# Patient Record
Sex: FEMALE | Race: WHITE | HISPANIC OR LATINO | Employment: FULL TIME | URBAN - METROPOLITAN AREA
[De-identification: names, ages, dates, MRNs, and addresses within clinical notes are randomized per-mention and may not be internally consistent; named-entity substitution may affect disease eponyms.]

---

## 2022-04-14 ENCOUNTER — TELEPHONE (OUTPATIENT)
Dept: INTERNAL MEDICINE | Facility: CLINIC | Age: 22
End: 2022-04-14

## 2022-04-14 NOTE — TELEPHONE ENCOUNTER
"Please call pt and tell her I am seeing pt in the office. I did respond to her on mychart    \"Usually insurance requires therapy x 6 wks before MRI will be approved. I have referred you to the orthopedic (bone doctor) to discuss further workup.   Elaina Sahni PA-C\"     "

## 2022-04-14 NOTE — TELEPHONE ENCOUNTER
Caller: Micah Gabriel    Relationship: Self    Best call back number: [phone]     What is the best time to reach you: 8 AM TO 10 AM     Who are you requesting to speak with NIXON DENG     What was the call regarding: PATIENT IS REQUESTING TO SPEAK ONLY WITH HER PROVIDER NIXON DENG OVER THE MATTER    Do you require a callback: YES

## 2022-11-21 ENCOUNTER — TELEPHONE (OUTPATIENT)
Dept: INTERNAL MEDICINE | Facility: CLINIC | Age: 22
End: 2022-11-21

## 2022-11-21 NOTE — TELEPHONE ENCOUNTER
Caller: Micah Gabriel    Relationship: Self    Best call back number: [phone]    Caller requesting test results: MICAH     What test was performed: X-RAY     When was the test performed: 11-15-22    Where was the test performed: [facility]     Additional notes:     PATIENT IS REQUESTING  A  CALL BACK REGARDING X-RAY RESULTS

## 2022-11-23 NOTE — TELEPHONE ENCOUNTER
Would rec the IBU as needed and we will start PT. It shows mild degenerative disease which is just wear and tear over time.

## 2022-11-23 NOTE — TELEPHONE ENCOUNTER
Hub staff attempted to follow warm transfer process and was unsuccessful     Caller: Micah Gabriel    Relationship to patient: Self    Best call back number: [phone]    Patient is needing: XRAY RESULTS FROM 11/15 - [facility] AND WOULD LIKE TO KNOW NEXT STEPS.    PLEASE ADVISE

## 2022-11-28 NOTE — TELEPHONE ENCOUNTER
Caller: Micah Gabriel    Relationship: Self    Best call back number: [phone]    What is the best time to reach you: ANY     Who are you requesting to speak with CLINICAL     What was the call regarding: PATIENT WOULD LIKE A CALL BACK REGARDING TEST RESULTS AND NEXT STEPS TO COMPLETE.       PLEASE ADVISE     CAN LEAVE VOICEMAIL IF PATENT IS NOT ABLE TO        Do you require a callback: YES

## 2022-12-12 NOTE — TELEPHONE ENCOUNTER
Spoke with patient and states she has been going to physical therapy she states she is doing better.

## 2023-04-07 ENCOUNTER — TELEPHONE (OUTPATIENT)
Dept: INTERNAL MEDICINE | Facility: CLINIC | Age: 23
End: 2023-04-07

## 2023-04-07 NOTE — TELEPHONE ENCOUNTER
Caller: Micah Gabriel    Relationship: Self    Best call back number: [phone]    What test was performed: LAB WORK     When was the test performed: 4.6.2023    Additional notes: PATIENT WOULD LIKE A CALL BACK TO DISCUSS HER LAB RESULTS AND THE MEDICATION PRESCRIBED.

## 2023-04-07 NOTE — TELEPHONE ENCOUNTER
Caller: Micah Gabriel    Relationship: Self    Best call back number: [phone]    What was the call regarding: PATIENT IS REQUESTING CALL IF THE MEDICATION CAN OR CAN'T BE FILLED. HER INSURANCE WILL  ON Monday, 04/10/2023 SO SHE WOULD LIKE SOMETHING SENT THIS WEEKEND.     Do you require a callback: YES

## 2024-05-31 ENCOUNTER — TELEPHONE (OUTPATIENT)
Dept: INTERNAL MEDICINE | Facility: CLINIC | Age: 24
End: 2024-05-31
Payer: COMMERCIAL

## 2024-05-31 NOTE — TELEPHONE ENCOUNTER
Caller: Micah Lyles    Relationship: Self    Best call back number: [phone]     What medication are you requesting:  BIRTH CONTROL PATCH    If a prescription is needed, what is your preferred pharmacy and phone number: [facility] [place] [id number] [facility] [address] [facility] - [phone]  - [phone] FX     Additional notes: PATIENT REACHING OUT TO OFFICE BECAUSE SHE DOES NOT CURRENTLY HAVE INSURANCE. PATIENT WANTING TO KNOW IF SHE NEEDS AN APPOINTMENT TO HAVE MEDICATION PRESCRIBED. IF OFFICE VISIT IS REQUIRED, PLEASE REACH OUT TO PATIENT TO SCHEDULE AND ADVISE HOW MUCH APPOINTMENT WOULD BE WITHOUT INSURANCE.     CAN CONTACT PATIENT VIA Medialive

## 2024-05-31 NOTE — TELEPHONE ENCOUNTER
Called and spoke with pt, explained to pt she would need an apt for the medication to be refilled, offered to schedule pt an apt at this time, pt declined and stated she is unsure when she can come into the office and will call us when she is ready to schedule.

## 2024-05-31 NOTE — TELEPHONE ENCOUNTER
PATIENT CALLED BACK UNABLE TO TRANSFER TO OFFICE.     PATIENT ASKS FOR A CALL BACK AT THE SAME NUMBER.

## 2024-06-27 ENCOUNTER — OFFICE VISIT (OUTPATIENT)
Dept: INTERNAL MEDICINE | Facility: CLINIC | Age: 24
End: 2024-06-27

## 2024-06-27 VITALS
HEIGHT: 62 IN | DIASTOLIC BLOOD PRESSURE: 68 MMHG | WEIGHT: 135 LBS | HEART RATE: 84 BPM | TEMPERATURE: 97.4 F | BODY MASS INDEX: 24.84 KG/M2 | RESPIRATION RATE: 15 BRPM | SYSTOLIC BLOOD PRESSURE: 122 MMHG | OXYGEN SATURATION: 99 %

## 2024-06-27 DIAGNOSIS — Z30.016 ENCOUNTER FOR INITIAL PRESCRIPTION OF TRANSDERMAL PATCH HORMONAL CONTRACEPTIVE DEVICE: Primary | ICD-10-CM

## 2024-06-27 PROBLEM — N76.1 CHRONIC VAGINITIS: Status: RESOLVED | Noted: 2022-06-20 | Resolved: 2024-06-27

## 2024-06-27 PROBLEM — R05.1 ACUTE COUGH: Status: RESOLVED | Noted: 2023-01-20 | Resolved: 2024-06-27

## 2024-06-27 PROBLEM — M25.531 RIGHT WRIST PAIN: Status: RESOLVED | Noted: 2022-05-23 | Resolved: 2024-06-27

## 2024-06-27 PROBLEM — S69.92XA INJURY OF LEFT WRIST: Status: RESOLVED | Noted: 2022-04-22 | Resolved: 2024-06-27

## 2024-06-27 PROBLEM — M25.432: Status: RESOLVED | Noted: 2022-07-19 | Resolved: 2024-06-27

## 2024-06-27 PROBLEM — N76.0 ACUTE VAGINITIS: Status: RESOLVED | Noted: 2022-03-30 | Resolved: 2024-06-27

## 2024-06-27 LAB
B-HCG UR QL: NEGATIVE
EXPIRATION DATE: NORMAL
INTERNAL NEGATIVE CONTROL: NORMAL
INTERNAL POSITIVE CONTROL: NORMAL
Lab: NORMAL

## 2024-06-27 PROCEDURE — 99213 OFFICE O/P EST LOW 20 MIN: CPT | Performed by: PHYSICIAN ASSISTANT

## 2024-06-27 PROCEDURE — 81025 URINE PREGNANCY TEST: CPT | Performed by: PHYSICIAN ASSISTANT

## 2024-06-27 RX ORDER — NORELGESTROMIN AND ETHINYL ESTRADIOL 35; 150 UG/MG; UG/MG
1 PATCH TRANSDERMAL WEEKLY
Qty: 3 PATCH | Refills: 12 | Status: SHIPPED | OUTPATIENT
Start: 2024-06-27 | End: 2025-06-27

## 2024-06-27 RX ORDER — NORELGESTROMIN AND ETHINYL ESTRADIOL 35; 150 UG/MG; UG/MG
1 PATCH TRANSDERMAL WEEKLY
Qty: 3 PATCH | Refills: 12 | Status: SHIPPED | OUTPATIENT
Start: 2024-06-27 | End: 2024-06-27

## 2024-06-27 NOTE — PROGRESS NOTES
"Chief Complaint  Contraception    Subjective          Micah Yang presents to [facility]  History of Present Illness  Pt here today to discuss starting birth control   She is unable to swallow pills   She would like patch   Pt currently sexually active   LMP 6/6/24  Periods are irregular at times. The past 3 months have between 30-40 days. Other times 20 days, other times 60 days.  Denies vaginal discharge, itching, odor.  Denies nicotine use.    Past Medical History:   Diagnosis Date    Chlamydia     Flu         Past Surgical History:   Procedure Laterality Date    FOOT SURGERY Left         Current Outpatient Medications on File Prior to Visit   Medication Sig Dispense Refill    [DISCONTINUED] albuterol sulfate  (90 Base) MCG/ACT inhaler Inhale 2 puffs Every 6 (Six) Hours As Needed for Wheezing. (Patient not taking: Reported on 4/6/2023) 18 g 0    [DISCONTINUED] brompheniramine-pseudoephedrine-DM 30-2-10 MG/5ML syrup Take 5 mL by mouth 4 (Four) Times a Day As Needed for Congestion, Cough or Allergies. (Patient not taking: Reported on 4/6/2023) 118 mL 0    [DISCONTINUED] fluconazole (Diflucan) 150 MG tablet Take 1 tablet by mouth Take As Directed. Take 1 tablet on day 1, repeat on day 4 if symptoms are not resolved. 2 tablet 0    [DISCONTINUED] fluticasone (FLONASE) 50 MCG/ACT nasal spray 2 sprays into the nostril(s) as directed by provider Daily for 14 days. 15.8 mL 0     No current facility-administered medications on file prior to visit.        No Known Allergies    Social History     Tobacco Use   Smoking Status Never   Smokeless Tobacco Never          Objective   Vital Signs:   /68 (BP Location: Left arm, Patient Position: Sitting, Cuff Size: Adult)   Pulse 84   Temp 97.4 °F (36.3 °C) (Temporal)   Resp 15   Ht 157.5 cm (62\")   Wt 61.2 kg (135 lb)   SpO2 99%   BMI 24.69 kg/m²     Physical Exam  Vitals reviewed.   Constitutional:     "   Appearance: Normal appearance.   HENT:      Head: Normocephalic and atraumatic.      Nose: Nose normal.      Mouth/Throat:      Mouth: Mucous membranes are moist.   Eyes:      Extraocular Movements: Extraocular movements intact.      Conjunctiva/sclera: Conjunctivae normal.      Pupils: Pupils are equal, round, and reactive to light.   Cardiovascular:      Rate and Rhythm: Normal rate and regular rhythm.   Pulmonary:      Effort: Pulmonary effort is normal.      Breath sounds: Normal breath sounds.   Abdominal:      General: Abdomen is flat. Bowel sounds are normal.      Palpations: Abdomen is soft.   Musculoskeletal:         General: Normal range of motion.   Neurological:      General: No focal deficit present.      Mental Status: She is alert and oriented to person, place, and time.   Psychiatric:         Mood and Affect: Mood normal.        Result Review :            BMI is within normal parameters. No other follow-up for BMI required.              Assessment and Plan    Diagnoses and all orders for this visit:    1. Encounter for initial prescription of transdermal patch hormonal contraceptive device (Primary)  -     POC Pregnancy, Urine    Other orders  -     Discontinue: norelgestromin-ethinyl estradiol (ORTHO EVRA) 150-35 MCG/24HR; Place 1 patch on the skin as directed by provider 1 (One) Time Per Week.  Dispense: 3 patch; Refill: 12  -     norelgestromin-ethinyl estradiol (ORTHO EVRA) 150-35 MCG/24HR; Place 1 patch on the skin as directed by provider 1 (One) Time Per Week.  Dispense: 3 patch; Refill: 12    Discussed different birth control options and possible side effects. Discussed OCP, patch, depo injection, nuva ring, Nexplanon, IUD. Pt would like the patch because she is unable to swallow pills. Given [employer] card since patient is self pay. Discussed safe sex, encouraged use of barrier protection (condom) every time to prevent against sexually transmitted diseases. Urine pregnancy test negative today.  Pt understands and agrees.      Follow Up   Return in about 1 year (around 6/27/2025).  Patient was given instructions and counseling regarding her condition or for health maintenance advice. Please see specific information pulled into the AVS if appropriate.

## 2024-12-18 ENCOUNTER — OFFICE VISIT (OUTPATIENT)
Dept: INTERNAL MEDICINE | Facility: CLINIC | Age: 24
End: 2024-12-18
Payer: COMMERCIAL

## 2024-12-18 VITALS
HEIGHT: 62 IN | HEART RATE: 73 BPM | SYSTOLIC BLOOD PRESSURE: 108 MMHG | DIASTOLIC BLOOD PRESSURE: 70 MMHG | RESPIRATION RATE: 16 BRPM | OXYGEN SATURATION: 96 % | BODY MASS INDEX: 25.4 KG/M2 | TEMPERATURE: 98.1 F | WEIGHT: 138 LBS

## 2024-12-18 DIAGNOSIS — B35.1 TOENAIL FUNGUS: ICD-10-CM

## 2024-12-18 DIAGNOSIS — Z00.00 ANNUAL PHYSICAL EXAM: Primary | ICD-10-CM

## 2024-12-18 DIAGNOSIS — Z11.59 SCREENING FOR VIRAL DISEASE: ICD-10-CM

## 2024-12-18 LAB
ALBUMIN SERPL-MCNC: 4.7 G/DL (ref 3.5–5.2)
ALBUMIN/GLOB SERPL: 1.4 G/DL
ALP SERPL-CCNC: 77 U/L (ref 39–117)
ALT SERPL W P-5'-P-CCNC: 11 U/L (ref 1–33)
ANION GAP SERPL CALCULATED.3IONS-SCNC: 9.3 MMOL/L (ref 5–15)
AST SERPL-CCNC: 20 U/L (ref 1–32)
BASOPHILS # BLD AUTO: 0.01 10*3/MM3 (ref 0–0.2)
BASOPHILS NFR BLD AUTO: 0.1 % (ref 0–1.5)
BILIRUB SERPL-MCNC: 0.3 MG/DL (ref 0–1.2)
BUN SERPL-MCNC: 12 MG/DL (ref 6–20)
BUN/CREAT SERPL: 12 (ref 7–25)
CALCIUM SPEC-SCNC: 9.9 MG/DL (ref 8.6–10.5)
CHLORIDE SERPL-SCNC: 107 MMOL/L (ref 98–107)
CHOLEST SERPL-MCNC: 210 MG/DL (ref 0–200)
CO2 SERPL-SCNC: 24.7 MMOL/L (ref 22–29)
CREAT SERPL-MCNC: 1 MG/DL (ref 0.57–1)
DEPRECATED RDW RBC AUTO: 41 FL (ref 37–54)
EGFRCR SERPLBLD CKD-EPI 2021: 79.8 ML/MIN/1.73
EOSINOPHIL # BLD AUTO: 0.16 10*3/MM3 (ref 0–0.4)
EOSINOPHIL NFR BLD AUTO: 2 % (ref 0.3–6.2)
ERYTHROCYTE [DISTWIDTH] IN BLOOD BY AUTOMATED COUNT: 12.2 % (ref 12.3–15.4)
GLOBULIN UR ELPH-MCNC: 3.4 GM/DL
GLUCOSE SERPL-MCNC: 86 MG/DL (ref 65–99)
HCT VFR BLD AUTO: 45.2 % (ref 34–46.6)
HCV AB SER QL: NORMAL
HDLC SERPL-MCNC: 69 MG/DL (ref 40–60)
HGB BLD-MCNC: 15 G/DL (ref 12–15.9)
IMM GRANULOCYTES # BLD AUTO: 0.03 10*3/MM3 (ref 0–0.05)
IMM GRANULOCYTES NFR BLD AUTO: 0.4 % (ref 0–0.5)
LDLC SERPL CALC-MCNC: 124 MG/DL (ref 0–100)
LDLC/HDLC SERPL: 1.77 {RATIO}
LYMPHOCYTES # BLD AUTO: 2.17 10*3/MM3 (ref 0.7–3.1)
LYMPHOCYTES NFR BLD AUTO: 26.8 % (ref 19.6–45.3)
MCH RBC QN AUTO: 30.4 PG (ref 26.6–33)
MCHC RBC AUTO-ENTMCNC: 33.2 G/DL (ref 31.5–35.7)
MCV RBC AUTO: 91.7 FL (ref 79–97)
MONOCYTES # BLD AUTO: 0.42 10*3/MM3 (ref 0.1–0.9)
MONOCYTES NFR BLD AUTO: 5.2 % (ref 5–12)
NEUTROPHILS NFR BLD AUTO: 5.3 10*3/MM3 (ref 1.7–7)
NEUTROPHILS NFR BLD AUTO: 65.5 % (ref 42.7–76)
NRBC BLD AUTO-RTO: 0 /100 WBC (ref 0–0.2)
PLATELET # BLD AUTO: 248 10*3/MM3 (ref 140–450)
PMV BLD AUTO: 10.2 FL (ref 6–12)
POTASSIUM SERPL-SCNC: 4.7 MMOL/L (ref 3.5–5.2)
PROT SERPL-MCNC: 8.1 G/DL (ref 6–8.5)
RBC # BLD AUTO: 4.93 10*6/MM3 (ref 3.77–5.28)
SODIUM SERPL-SCNC: 141 MMOL/L (ref 136–145)
TRIGL SERPL-MCNC: 94 MG/DL (ref 0–150)
TSH SERPL DL<=0.05 MIU/L-ACNC: 1.57 UIU/ML (ref 0.27–4.2)
VLDLC SERPL-MCNC: 17 MG/DL (ref 5–40)
WBC NRBC COR # BLD AUTO: 8.09 10*3/MM3 (ref 3.4–10.8)

## 2024-12-18 PROCEDURE — 90715 TDAP VACCINE 7 YRS/> IM: CPT | Performed by: PHYSICIAN ASSISTANT

## 2024-12-18 PROCEDURE — 80050 GENERAL HEALTH PANEL: CPT | Performed by: PHYSICIAN ASSISTANT

## 2024-12-18 PROCEDURE — 90471 IMMUNIZATION ADMIN: CPT | Performed by: PHYSICIAN ASSISTANT

## 2024-12-18 PROCEDURE — 99395 PREV VISIT EST AGE 18-39: CPT | Performed by: PHYSICIAN ASSISTANT

## 2024-12-18 PROCEDURE — 86803 HEPATITIS C AB TEST: CPT | Performed by: PHYSICIAN ASSISTANT

## 2024-12-18 PROCEDURE — 80061 LIPID PANEL: CPT | Performed by: PHYSICIAN ASSISTANT

## 2024-12-18 RX ORDER — TERBINAFINE HYDROCHLORIDE 250 MG/1
250 TABLET ORAL DAILY
Qty: 30 TABLET | Refills: 1 | Status: SHIPPED | OUTPATIENT
Start: 2024-12-18

## 2024-12-18 NOTE — ASSESSMENT & PLAN NOTE
Discussed toe nail fungus. Discussed medication can be harsh on the liver, avoid alcohol and tylenol while taking medicine. Will get labs to evaluate liver function and make sure medication would be safe for patient.

## 2024-12-18 NOTE — PROGRESS NOTES
"Chief Complaint  Pt here for physical and Nail Problem    Subjective          Micah Yang presents to [facility]  History of Present Illness    Pt here for physical and nail issue  Nail on big toes on bilateral feet started getting discolored in Aug 2024  Denies pain  Discoloration has spread. Looks blackened and cloudy    Birth control: using patches  States previously using [employer] brand which stuck better   Denies patches falling off        Past Medical History:   Diagnosis Date    Chlamydia     Flu         Past Surgical History:   Procedure Laterality Date    FOOT SURGERY Left         Current Outpatient Medications on File Prior to Visit   Medication Sig Dispense Refill    norelgestromin-ethinyl estradiol (ORTHO EVRA) 150-35 MCG/24HR Place 1 patch on the skin as directed by provider 1 (One) Time Per Week. 3 patch 12     No current facility-administered medications on file prior to visit.        No Known Allergies    Social History     Tobacco Use   Smoking Status Never   Smokeless Tobacco Never          Objective   Vital Signs:   /70 (BP Location: Left arm, Patient Position: Sitting, Cuff Size: Adult)   Pulse 73   Temp 98.1 °F (36.7 °C) (Temporal)   Resp 16   Ht 157.5 cm (62\")   Wt 62.6 kg (138 lb)   SpO2 96%   BMI 25.24 kg/m²     Physical Exam  Vitals reviewed.   Constitutional:       Appearance: Normal appearance.   HENT:      Head: Normocephalic and atraumatic.      Nose: Nose normal.      Mouth/Throat:      Mouth: Mucous membranes are moist.   Eyes:      Extraocular Movements: Extraocular movements intact.      Conjunctiva/sclera: Conjunctivae normal.      Pupils: Pupils are equal, round, and reactive to light.   Cardiovascular:      Rate and Rhythm: Normal rate and regular rhythm.   Pulmonary:      Effort: Pulmonary effort is normal.      Breath sounds: Normal breath sounds.   Abdominal:      General: Abdomen is flat. Bowel sounds are " normal.      Palpations: Abdomen is soft.   Musculoskeletal:         General: Normal range of motion.   Skin:     Comments: Bilateral great toe nails thickened, yellow/brown   Neurological:      General: No focal deficit present.      Mental Status: She is alert and oriented to person, place, and time.   Psychiatric:         Mood and Affect: Mood normal.        Result Review :                           Assessment and Plan    Diagnoses and all orders for this visit:    1. Annual physical exam (Primary)  Assessment & Plan:  Reviewed preventative medication recommendations that are age appropriate for the patient. Education provided for health and wellness. Encouraged healthy diet, regular exercise, and routine wellness checkups.      Orders:  -     Comprehensive Metabolic Panel  -     CBC & Differential  -     TSH  -     Lipid Panel  -     Tdap Vaccine => 6yo IM (BOOSTRIX/ADACEL)    2. Toenail fungus  Assessment & Plan:  Discussed toe nail fungus. Discussed medication can be harsh on the liver, avoid alcohol and tylenol while taking medicine. Will get labs to evaluate liver function and make sure medication would be safe for patient.         3. Screening for viral disease  -     Hepatitis C Antibody    Other orders  -     terbinafine (lamiSIL) 250 MG tablet; Take 1 tablet by mouth Daily.  Dispense: 30 tablet; Refill: 1        Follow Up   No follow-ups on file.  Patient was given instructions and counseling regarding her condition or for health maintenance advice. Please see specific information pulled into the AVS if appropriate.